# Patient Record
(demographics unavailable — no encounter records)

---

## 2025-05-19 NOTE — PHYSICAL EXAM
[General Appearance - Well Developed] : well developed [General Appearance - Well Nourished] : well nourished [Skin Color & Pigmentation] : normal skin color and pigmentation [FreeTextEntry2] : pelvid deferred

## 2025-05-19 NOTE — HISTORY OF PRESENT ILLNESS
[FreeTextEntry1] : 72 year old female presents to the office for a c/o urinary frequency. She states that she wakes every hour at night and has frequency during the day but is not bothersome. She has some urge incontinence at time and wears depends as cautionary  Patient has 5 children which were all vaginal deliveries  No surgery to pelvic area or radiation  Denies gross hematuria and dysuria.